# Patient Record
Sex: FEMALE | Race: WHITE | NOT HISPANIC OR LATINO
[De-identification: names, ages, dates, MRNs, and addresses within clinical notes are randomized per-mention and may not be internally consistent; named-entity substitution may affect disease eponyms.]

---

## 2022-03-15 ENCOUNTER — APPOINTMENT (OUTPATIENT)
Dept: HEART AND VASCULAR | Facility: CLINIC | Age: 47
End: 2022-03-15
Payer: MEDICAID

## 2022-03-15 ENCOUNTER — NON-APPOINTMENT (OUTPATIENT)
Age: 47
End: 2022-03-15

## 2022-03-15 VITALS
DIASTOLIC BLOOD PRESSURE: 66 MMHG | BODY MASS INDEX: 21.35 KG/M2 | HEART RATE: 94 BPM | SYSTOLIC BLOOD PRESSURE: 122 MMHG | WEIGHT: 116 LBS | HEIGHT: 62 IN

## 2022-03-15 DIAGNOSIS — R07.89 OTHER CHEST PAIN: ICD-10-CM

## 2022-03-15 DIAGNOSIS — Z82.49 FAMILY HISTORY OF ISCHEMIC HEART DISEASE AND OTHER DISEASES OF THE CIRCULATORY SYSTEM: ICD-10-CM

## 2022-03-15 DIAGNOSIS — Z00.00 ENCOUNTER FOR GENERAL ADULT MEDICAL EXAMINATION W/OUT ABNORMAL FINDINGS: ICD-10-CM

## 2022-03-15 DIAGNOSIS — R06.00 DYSPNEA, UNSPECIFIED: ICD-10-CM

## 2022-03-15 PROCEDURE — 93306 TTE W/DOPPLER COMPLETE: CPT

## 2022-03-15 PROCEDURE — 93000 ELECTROCARDIOGRAM COMPLETE: CPT

## 2022-03-15 PROCEDURE — 99204 OFFICE O/P NEW MOD 45 MIN: CPT

## 2022-03-15 PROCEDURE — 36415 COLL VENOUS BLD VENIPUNCTURE: CPT

## 2022-03-15 PROCEDURE — 99204 OFFICE O/P NEW MOD 45 MIN: CPT | Mod: 25

## 2022-03-15 RX ORDER — CYCLOBENZAPRINE HYDROCHLORIDE 5 MG/1
5 TABLET, FILM COATED ORAL
Qty: 14 | Refills: 3 | Status: ACTIVE | COMMUNITY
Start: 2022-03-15 | End: 1900-01-01

## 2022-03-16 LAB
CRP SERPL-MCNC: <3 MG/L
ERYTHROCYTE [SEDIMENTATION RATE] IN BLOOD BY WESTERGREN METHOD: 2 MM/HR

## 2022-03-17 PROBLEM — R06.00 DYSPNEA ON EXERTION: Status: ACTIVE | Noted: 2022-03-15

## 2022-03-17 PROBLEM — R07.89 CHEST DISCOMFORT: Status: ACTIVE | Noted: 2022-03-15

## 2022-03-17 RX ORDER — DIPHENHYD/PHENYLEPH/ACETAMINOP 25-5-325MG
200 TABLET ORAL
Qty: 60 | Refills: 0 | Status: ACTIVE | COMMUNITY
Start: 2022-03-17

## 2022-03-17 NOTE — DISCUSSION/SUMMARY
[Non-Cardiac] : non-cardiac chest pain [Stable] : stable [C-Reactive Protein] : C-reactive protein [Echocardiogram] : echocardiogram [Medication Changes Per Orders] : Medication changes are as documented in orders [FreeTextEntry1] : Non cardiac pain \par Echo Lvef 55% No effusion noted \par C rp / esr pending

## 2022-03-17 NOTE — REVIEW OF SYSTEMS
[SOB] : shortness of breath [Dyspnea on exertion] : dyspnea during exertion [Chest Discomfort] : chest discomfort [Cough] : cough [Negative] : Integumentary [Fever] : no fever [Chills] : no chills [Leg Claudication] : no intermittent leg claudication [Orthopnea] : no orthopnea [Syncope] : no syncope [Wheezing] : no wheezing [Coughing Up Blood] : no hemoptysis [Snoring] : no snoring

## 2022-03-17 NOTE — HISTORY OF PRESENT ILLNESS
[FreeTextEntry1] : Patient is a 46-year-old white female with no significant past medical history she is a non-smoker.  Her father had some heart disease in his 60s her siblings have no history of heart disease.  Patient was in her usual state of health until about a week ago when after doing a yoga class she started to experience chest pressure across both parts of her of her chest and her torso with accompanying shortness of breath.  Patient cannot recall if there is any effort based symptomatology but she does notice she is more uncomfortable in bed while searching for a comfortable position.  She denies any recent cough upper respiratory infection fever or chills.  She denies any gastrointestinal symptomatology.